# Patient Record
Sex: MALE | URBAN - NONMETROPOLITAN AREA
[De-identification: names, ages, dates, MRNs, and addresses within clinical notes are randomized per-mention and may not be internally consistent; named-entity substitution may affect disease eponyms.]

---

## 2018-05-29 ENCOUNTER — NEW PATIENT (OUTPATIENT)
Dept: URBAN - NONMETROPOLITAN AREA CLINIC 7 | Facility: CLINIC | Age: 83
End: 2018-05-29
Payer: COMMERCIAL

## 2018-05-29 PROCEDURE — 99204 OFFICE O/P NEW MOD 45 MIN: CPT | Performed by: OPTOMETRIST

## 2018-05-29 PROCEDURE — 92134 CPTRZ OPH DX IMG PST SGM RTA: CPT | Performed by: OPTOMETRIST

## 2018-05-29 ASSESSMENT — INTRAOCULAR PRESSURE
OS: 18
OD: 16

## 2018-05-29 ASSESSMENT — VISUAL ACUITY
OS: 20/20
OD: 20/20

## 2021-02-11 ENCOUNTER — FOLLOW UP ESTABLISHED (OUTPATIENT)
Dept: URBAN - NONMETROPOLITAN AREA CLINIC 7 | Facility: CLINIC | Age: 86
End: 2021-02-11
Payer: COMMERCIAL

## 2021-02-11 DIAGNOSIS — H04.123 TEAR FILM INSUFFICIENCY OF BILATERAL LACRIMAL GLANDS: Primary | ICD-10-CM

## 2021-02-11 PROCEDURE — 92134 CPTRZ OPH DX IMG PST SGM RTA: CPT | Performed by: OPTOMETRIST

## 2021-02-11 PROCEDURE — 92014 COMPRE OPH EXAM EST PT 1/>: CPT | Performed by: OPTOMETRIST

## 2021-02-11 ASSESSMENT — INTRAOCULAR PRESSURE
OS: 15
OD: 15

## 2021-08-11 ENCOUNTER — OFFICE VISIT (OUTPATIENT)
Dept: URBAN - NONMETROPOLITAN AREA CLINIC 7 | Facility: CLINIC | Age: 86
End: 2021-08-11
Payer: COMMERCIAL

## 2021-08-11 DIAGNOSIS — Z96.1 PRESENCE OF INTRAOCULAR LENS: ICD-10-CM

## 2021-08-11 DIAGNOSIS — T15.11XA FOREIGN BODY IN CONJUNCTIVAL SAC OF RIGHT EYE, INITIAL ENCOUNTER: ICD-10-CM

## 2021-08-11 PROCEDURE — 99213 OFFICE O/P EST LOW 20 MIN: CPT | Performed by: OPTOMETRIST

## 2021-08-11 PROCEDURE — 65205 REMOVE FOREIGN BODY FROM EYE: CPT | Performed by: OPTOMETRIST

## 2021-08-11 PROCEDURE — 92134 CPTRZ OPH DX IMG PST SGM RTA: CPT | Performed by: OPTOMETRIST

## 2021-08-11 ASSESSMENT — INTRAOCULAR PRESSURE
OS: 16
OD: 15

## 2021-08-11 NOTE — IMPRESSION/PLAN
Impression: Tear film insufficiency of bilateral lacrimal glands Plan: Continue artificial tears, a minimum of BID-TID.

## 2021-08-11 NOTE — IMPRESSION/PLAN
Impression: Foreign body in conjunctival sac of right eye, initial encounter: T15.11XA. Plan: Removed at the slit lamp with cotton tip.

## 2021-08-11 NOTE — IMPRESSION/PLAN
Impression: Nonexudative macular degeneration, early dry stage, bilateral
OD>OS Plan: Stable on Mac OCT testing today. Continue to monitor. Patient education.

## 2022-02-15 ENCOUNTER — OFFICE VISIT (OUTPATIENT)
Dept: URBAN - NONMETROPOLITAN AREA CLINIC 7 | Facility: CLINIC | Age: 87
End: 2022-02-15
Payer: MEDICARE

## 2022-02-15 DIAGNOSIS — H35.3131 NONEXUDATIVE AGE-RELATED MACULAR DEGENERATION, BILATERAL, EARLY DRY STAGE: Primary | ICD-10-CM

## 2022-02-15 DIAGNOSIS — H52.4 PRESBYOPIA: ICD-10-CM

## 2022-02-15 DIAGNOSIS — H43.813 BILATERAL VITREOUS DETACHMENT OF EYES: ICD-10-CM

## 2022-02-15 DIAGNOSIS — H16.223 KERATOCONJUNCT SICCA, NOT SPECIFIED AS SJOGREN'S, BILATERAL: ICD-10-CM

## 2022-02-15 PROCEDURE — 92014 COMPRE OPH EXAM EST PT 1/>: CPT | Performed by: OPTOMETRIST

## 2022-02-15 PROCEDURE — 92134 CPTRZ OPH DX IMG PST SGM RTA: CPT | Performed by: OPTOMETRIST

## 2022-02-15 ASSESSMENT — VISUAL ACUITY
OD: 20/30
OS: 20/25

## 2022-02-15 ASSESSMENT — INTRAOCULAR PRESSURE
OD: 14
OS: 14

## 2022-02-15 NOTE — IMPRESSION/PLAN
Impression: Presence of intraocular lens ; OU Plan: Lens implant(s) appear stable. Continue to monitor.

## 2022-02-15 NOTE — IMPRESSION/PLAN
Impression: Bilateral vitreous detachment of eyes: H43.813. Plan: Patient education regarding symptoms and risks of retinal holes, tears, and detachment. The patient was instructed to contact office immediately should symptoms occur. Continue to monitor.

## 2022-02-15 NOTE — IMPRESSION/PLAN
Impression: Nonexudative macular degeneration, early dry stage, bilateral
OD>OS Plan: Appears relatively stable today. I recommended use of AREDS-based vitamins. I will monitor for future change. Patient education provided. OCT ordered for future comparison for signs of progression.

## 2022-02-15 NOTE — IMPRESSION/PLAN
Impression: Keratoconjunct sicca, not specified as Sjogren's, bilateral: Q64.841. Plan: I encouraged use of artificial tears QID.

## 2022-05-18 ENCOUNTER — OFFICE VISIT (OUTPATIENT)
Dept: URBAN - NONMETROPOLITAN AREA CLINIC 7 | Facility: CLINIC | Age: 87
End: 2022-05-18
Payer: MEDICARE

## 2022-05-18 DIAGNOSIS — H35.3131 NONEXUDATIVE AGE-RELATED MACULAR DEGENERATION, BILATERAL, EARLY DRY STAGE: ICD-10-CM

## 2022-05-18 DIAGNOSIS — G43.101 MIGRAINE WITH AURA, NOT INTRACTABLE, WITH STATUS MIGRAINOSUS: Primary | ICD-10-CM

## 2022-05-18 DIAGNOSIS — Z96.1 PRESENCE OF INTRAOCULAR LENS: ICD-10-CM

## 2022-05-18 PROCEDURE — 92133 CPTRZD OPH DX IMG PST SGM ON: CPT | Performed by: OPTOMETRIST

## 2022-05-18 PROCEDURE — 99214 OFFICE O/P EST MOD 30 MIN: CPT | Performed by: OPTOMETRIST

## 2022-05-18 ASSESSMENT — INTRAOCULAR PRESSURE
OS: 14
OD: 14

## 2022-05-18 NOTE — IMPRESSION/PLAN
Impression: Migraine with aura, not intractable, with status migrainosus: G43.101. Plan: Suspected based on pt's description. OCT RNFL ordered today. RTC for N/A VF 24-2. Pt ed.

## 2022-05-18 NOTE — IMPRESSION/PLAN
Impression: Nonexudative age-related macular degeneration, bilateral, early dry stage: H35.3131. Plan: Appears stable. Patient education regarding findings. Considering the presentation, AREDS-based vitamins would be of limited benefit. I will continue to monitor for future change. OCT ordered for future comparison for signs of progression.

## 2022-09-28 ENCOUNTER — OFFICE VISIT (OUTPATIENT)
Dept: URBAN - NONMETROPOLITAN AREA CLINIC 7 | Facility: CLINIC | Age: 87
End: 2022-09-28
Payer: MEDICARE

## 2022-09-28 DIAGNOSIS — H16.223 KERATOCONJUNCT SICCA, NOT SPECIFIED AS SJOGREN'S, BILATERAL: Primary | ICD-10-CM

## 2022-09-28 PROCEDURE — 99213 OFFICE O/P EST LOW 20 MIN: CPT | Performed by: OPTOMETRIST

## 2022-09-28 ASSESSMENT — INTRAOCULAR PRESSURE
OD: 12
OS: 13

## 2022-09-28 NOTE — IMPRESSION/PLAN
Impression: Keratoconjunct sicca, not specified as Sjogren's, bilateral: Z68.102. Plan: Mild inflammation. Reinforced use of artificial tears QID.

## 2022-10-27 ENCOUNTER — TESTING ONLY (OUTPATIENT)
Dept: URBAN - NONMETROPOLITAN AREA CLINIC 7 | Facility: CLINIC | Age: 87
End: 2022-10-27
Payer: MEDICARE

## 2022-10-27 DIAGNOSIS — G43.101 MIGRAINE WITH AURA, NOT INTRACTABLE, WITH STATUS MIGRAINOSUS: Primary | ICD-10-CM

## 2022-10-27 PROCEDURE — 92083 EXTENDED VISUAL FIELD XM: CPT | Performed by: OPTOMETRIST

## 2023-01-25 ENCOUNTER — OFFICE VISIT (OUTPATIENT)
Dept: URBAN - NONMETROPOLITAN AREA CLINIC 7 | Facility: CLINIC | Age: 88
End: 2023-01-25
Payer: MEDICARE

## 2023-01-25 DIAGNOSIS — H35.3131 NONEXUDATIVE AGE-RELATED MACULAR DEGENERATION, BILATERAL, EARLY DRY STAGE: Primary | ICD-10-CM

## 2023-01-25 DIAGNOSIS — H43.813 VITREOUS DEGENERATION, BILATERAL: ICD-10-CM

## 2023-01-25 DIAGNOSIS — H16.223 KERATOCONJUNCTIVITIS SICCA, BILATERAL: ICD-10-CM

## 2023-01-25 DIAGNOSIS — Z96.1 PRESENCE OF INTRAOCULAR LENS: ICD-10-CM

## 2023-01-25 PROCEDURE — 92014 COMPRE OPH EXAM EST PT 1/>: CPT | Performed by: OPTOMETRIST

## 2023-01-25 PROCEDURE — 92134 CPTRZ OPH DX IMG PST SGM RTA: CPT | Performed by: OPTOMETRIST

## 2023-01-25 ASSESSMENT — INTRAOCULAR PRESSURE
OS: 14
OD: 13

## 2023-01-25 NOTE — IMPRESSION/PLAN
Impression: Vitreous degeneration, bilateral: H43.813. Plan: Patient education provided regarding symptoms and risks of retinal holes, tears, and detachment. The patient was instructed to contact office immediately should symptoms occur. Continue to monitor.

## 2023-01-25 NOTE — IMPRESSION/PLAN
Impression: Keratoconjunctivitis sicca, bilateral: A57.019. Plan: I recommended use of artificial tears, a minimum of BID.

## 2023-01-25 NOTE — IMPRESSION/PLAN
Impression: Presence of intraocular lens ; OU Plan: The lens implants appear to be stable. Continue to monitor.

## 2023-01-25 NOTE — IMPRESSION/PLAN
Impression: Nonexudative age-related macular degeneration, bilateral, early dry stage: H35.3131. Plan: Appears stable. Patient education regarding findings. Considering the presentation, AREDS-based vitamins would be of limited benefit. I will continue to monitor for future change. OCT ordered for progression.

## 2023-07-12 ENCOUNTER — OFFICE VISIT (OUTPATIENT)
Dept: URBAN - NONMETROPOLITAN AREA CLINIC 7 | Facility: CLINIC | Age: 88
End: 2023-07-12
Payer: MEDICARE

## 2023-07-12 DIAGNOSIS — H16.223 KERATOCONJUNCTIVITIS SICCA, BILATERAL: ICD-10-CM

## 2023-07-12 DIAGNOSIS — H35.3131 NONEXUDATIVE AGE-RELATED MACULAR DEGENERATION, BILATERAL, EARLY DRY STAGE: Primary | ICD-10-CM

## 2023-07-12 DIAGNOSIS — Z96.1 PRESENCE OF INTRAOCULAR LENS: ICD-10-CM

## 2023-07-12 PROCEDURE — 92134 CPTRZ OPH DX IMG PST SGM RTA: CPT | Performed by: OPTOMETRIST

## 2023-07-12 PROCEDURE — 99213 OFFICE O/P EST LOW 20 MIN: CPT | Performed by: OPTOMETRIST

## 2023-07-12 ASSESSMENT — INTRAOCULAR PRESSURE
OS: 15
OD: 16

## 2023-07-12 NOTE — IMPRESSION/PLAN
Impression: Nonexudative age-related macular degeneration, bilateral, early dry stage: H35.3131. Plan: Stable. Continue to monitor. MAC OCT ordered for progression. Continue use of AREDS vitamins.

## 2023-07-12 NOTE — IMPRESSION/PLAN
Impression: Keratoconjunctivitis sicca, bilateral: I51.366. Plan: Continue use of artificial tears, a minimum of BID.

## 2024-01-17 ENCOUNTER — OFFICE VISIT (OUTPATIENT)
Dept: URBAN - NONMETROPOLITAN AREA CLINIC 7 | Facility: CLINIC | Age: 89
End: 2024-01-17
Payer: MEDICARE

## 2024-01-17 DIAGNOSIS — H52.4 PRESBYOPIA: ICD-10-CM

## 2024-01-17 DIAGNOSIS — H43.813 VITREOUS DEGENERATION, BILATERAL: ICD-10-CM

## 2024-01-17 DIAGNOSIS — Z96.1 PRESENCE OF INTRAOCULAR LENS: ICD-10-CM

## 2024-01-17 DIAGNOSIS — H16.223 KERATOCONJUNCTIVITIS SICCA, BILATERAL: ICD-10-CM

## 2024-01-17 DIAGNOSIS — H35.3131 NONEXUDATIVE AGE-RELATED MACULAR DEGENERATION, BILATERAL, EARLY DRY STAGE: Primary | ICD-10-CM

## 2024-01-17 PROCEDURE — 92014 COMPRE OPH EXAM EST PT 1/>: CPT | Performed by: OPTOMETRIST

## 2024-01-17 PROCEDURE — 92134 CPTRZ OPH DX IMG PST SGM RTA: CPT | Performed by: OPTOMETRIST

## 2024-01-17 ASSESSMENT — INTRAOCULAR PRESSURE
OD: 15
OS: 15

## 2024-01-17 ASSESSMENT — VISUAL ACUITY
OS: 20/20
OD: 20/30